# Patient Record
Sex: FEMALE | Race: BLACK OR AFRICAN AMERICAN | Employment: OTHER | URBAN - METROPOLITAN AREA
[De-identification: names, ages, dates, MRNs, and addresses within clinical notes are randomized per-mention and may not be internally consistent; named-entity substitution may affect disease eponyms.]

---

## 2017-10-09 ENCOUNTER — APPOINTMENT (OUTPATIENT)
Dept: GENERAL RADIOLOGY | Age: 77
End: 2017-10-09
Attending: EMERGENCY MEDICINE
Payer: MEDICARE

## 2017-10-09 ENCOUNTER — HOSPITAL ENCOUNTER (EMERGENCY)
Age: 77
Discharge: HOME | End: 2017-10-09
Attending: EMERGENCY MEDICINE | Admitting: EMERGENCY MEDICINE
Payer: MEDICARE

## 2017-10-09 VITALS
OXYGEN SATURATION: 97 % | HEART RATE: 83 BPM | DIASTOLIC BLOOD PRESSURE: 69 MMHG | SYSTOLIC BLOOD PRESSURE: 120 MMHG | RESPIRATION RATE: 19 BRPM | TEMPERATURE: 98.5 F

## 2017-10-09 DIAGNOSIS — R53.1 WEAKNESS: Primary | ICD-10-CM

## 2017-10-09 LAB
ANION GAP SERPL CALC-SCNC: 9 MMOL/L (ref 3–18)
APPEARANCE UR: ABNORMAL
BACTERIA URNS QL MICRO: ABNORMAL /HPF
BASOPHILS # BLD: 0 K/UL (ref 0–0.06)
BASOPHILS NFR BLD: 0 % (ref 0–2)
BILIRUB UR QL: NEGATIVE
BUN SERPL-MCNC: 15 MG/DL (ref 7–18)
BUN/CREAT SERPL: 22 (ref 12–20)
CALCIUM SERPL-MCNC: 9.7 MG/DL (ref 8.5–10.1)
CHLORIDE SERPL-SCNC: 99 MMOL/L (ref 100–108)
CO2 SERPL-SCNC: 32 MMOL/L (ref 21–32)
COLOR UR: ABNORMAL
CREAT SERPL-MCNC: 0.67 MG/DL (ref 0.6–1.3)
DIFFERENTIAL METHOD BLD: ABNORMAL
EOSINOPHIL # BLD: 0.1 K/UL (ref 0–0.4)
EOSINOPHIL NFR BLD: 3 % (ref 0–5)
EPITH CASTS URNS QL MICRO: ABNORMAL /LPF (ref 0–5)
ERYTHROCYTE [DISTWIDTH] IN BLOOD BY AUTOMATED COUNT: 13.8 % (ref 11.6–14.5)
GLUCOSE SERPL-MCNC: 84 MG/DL (ref 74–99)
GLUCOSE UR STRIP.AUTO-MCNC: NEGATIVE MG/DL
HCT VFR BLD AUTO: 36.7 % (ref 35–45)
HGB BLD-MCNC: 11.7 G/DL (ref 12–16)
HGB UR QL STRIP: NEGATIVE
KETONES UR QL STRIP.AUTO: NEGATIVE MG/DL
LEUKOCYTE ESTERASE UR QL STRIP.AUTO: ABNORMAL
LYMPHOCYTES # BLD: 1 K/UL (ref 0.9–3.6)
LYMPHOCYTES NFR BLD: 25 % (ref 21–52)
MCH RBC QN AUTO: 30.6 PG (ref 24–34)
MCHC RBC AUTO-ENTMCNC: 31.9 G/DL (ref 31–37)
MCV RBC AUTO: 96.1 FL (ref 74–97)
MONOCYTES # BLD: 0.2 K/UL (ref 0.05–1.2)
MONOCYTES NFR BLD: 6 % (ref 3–10)
NEUTS SEG # BLD: 2.7 K/UL (ref 1.8–8)
NEUTS SEG NFR BLD: 66 % (ref 40–73)
NITRITE UR QL STRIP.AUTO: NEGATIVE
PH UR STRIP: 6 [PH] (ref 5–8)
PLATELET # BLD AUTO: 188 K/UL (ref 135–420)
PMV BLD AUTO: 10.5 FL (ref 9.2–11.8)
POTASSIUM SERPL-SCNC: 3.6 MMOL/L (ref 3.5–5.5)
PROT UR STRIP-MCNC: ABNORMAL MG/DL
RBC # BLD AUTO: 3.82 M/UL (ref 4.2–5.3)
RBC #/AREA URNS HPF: 0 /HPF (ref 0–5)
SODIUM SERPL-SCNC: 140 MMOL/L (ref 136–145)
SP GR UR REFRACTOMETRY: 1.02 (ref 1–1.03)
TROPONIN I SERPL-MCNC: <0.02 NG/ML (ref 0–0.04)
TSH SERPL DL<=0.05 MIU/L-ACNC: 2.04 UIU/ML (ref 0.36–3.74)
UROBILINOGEN UR QL STRIP.AUTO: 1 EU/DL (ref 0.2–1)
WBC # BLD AUTO: 4 K/UL (ref 4.6–13.2)
WBC URNS QL MICRO: ABNORMAL /HPF (ref 0–4)

## 2017-10-09 PROCEDURE — 71010 XR CHEST PORT: CPT

## 2017-10-09 PROCEDURE — 84484 ASSAY OF TROPONIN QUANT: CPT | Performed by: EMERGENCY MEDICINE

## 2017-10-09 PROCEDURE — 81001 URINALYSIS AUTO W/SCOPE: CPT | Performed by: EMERGENCY MEDICINE

## 2017-10-09 PROCEDURE — 93005 ELECTROCARDIOGRAM TRACING: CPT

## 2017-10-09 PROCEDURE — 80048 BASIC METABOLIC PNL TOTAL CA: CPT | Performed by: EMERGENCY MEDICINE

## 2017-10-09 PROCEDURE — 85025 COMPLETE CBC W/AUTO DIFF WBC: CPT | Performed by: EMERGENCY MEDICINE

## 2017-10-09 PROCEDURE — 84443 ASSAY THYROID STIM HORMONE: CPT | Performed by: EMERGENCY MEDICINE

## 2017-10-09 PROCEDURE — 99285 EMERGENCY DEPT VISIT HI MDM: CPT

## 2017-10-09 NOTE — ED TRIAGE NOTES
Per EMS-Patient with generalized weakness. Patient's neighbor states she came over to check on patient and found her in the home with the heat turned all the way up with the patient complaining of weakness all over. Patient has alzheimer's.

## 2017-10-09 NOTE — DISCHARGE INSTRUCTIONS
Weakness: Care Instructions  Your Care Instructions  Weakness is a lack of physical or muscle strength. You may feel that you need to make extra effort to move your arms, legs, or other muscles. Generalized weakness means that you feel weak in most areas of your body. Another type of weakness may affect just one muscle or group of muscles. You may feel weak and tired after you have done too much activity, such as taking an extra-long hike. This is not a serious problem. It often goes away on its own. Feeling weak can also be caused by medical conditions like thyroid problems, depression, or a virus. Sometimes the cause can be serious. Your doctor may want to do more tests to try to find the cause of the weakness. The doctor has checked you carefully, but problems can develop later. If you notice any problems or new symptoms, get medical treatment right away. Follow-up care is a key part of your treatment and safety. Be sure to make and go to all appointments, and call your doctor if you are having problems. It's also a good idea to know your test results and keep a list of the medicines you take. How can you care for yourself at home? · Rest when you feel tired. · Be safe with medicines. If your doctor prescribed medicine, take it exactly as prescribed. Call your doctor if you think you are having a problem with your medicine. You will get more details on the specific medicines your doctor prescribes. · Do not skip meals. Eating a balanced diet may increase your energy level. · Get some physical activity every day, but do not get too tired. When should you call for help? Call your doctor now or seek immediate medical care if:  · You have new or worse weakness. · You are dizzy or lightheaded, or you feel like you may faint. Watch closely for changes in your health, and be sure to contact your doctor if:  · You do not get better as expected. Where can you learn more?   Go to http://emir.info/. Enter [id number] in the search box to learn more about \"Weakness: Care Instructions. \"  Current as of: March 20, 2017  Content Version: 11.3  © 1856-7762 [employer]. Care instructions adapted under license by [employer] (which disclaims liability or warranty for this information). If you have questions about a medical condition or this instruction, always ask your healthcare professional. [address] any warranty or liability for your use of this information.

## 2017-10-09 NOTE — ED NOTES
I have reviewed discharge instructions with the patient. The patient verbalized understanding. Patient taken out by wheelchair and taken home by family.      Patient armband removed and shredded

## 2017-10-09 NOTE — ED PROVIDER NOTES
HPI Comments: 1:21 PM Lexi Retana is a 76 y.o. female w/ PMHx of HTN and sarcoidosis who presents to the ED c/o  generalized body weakness and fatigue. Pt states she lives at home and she currently denies pain, SOB, CP, and fever. Pt has no other sx or complaints. Past Medical History:   Diagnosis Date    Hypertension     Ill-defined condition     sarcoidosis    Respiratory abnormalities     sarcoidosis       History reviewed. No pertinent surgical history. History reviewed. No pertinent family history. Social History     Social History    Marital status:      Spouse name: N/A    Number of children: N/A    Years of education: N/A     Occupational History    Not on file. Social History Main Topics    Smoking status: Former Smoker     Packs/day: 1.00     Years: 20.00    Smokeless tobacco: Not on file    Alcohol use No    Drug use: Not on file    Sexual activity: Not on file     Other Topics Concern    Not on file     Social History Narrative         ALLERGIES: Review of patient's allergies indicates no known allergies. Review of Systems   Constitutional: Positive for fatigue. Negative for diaphoresis and fever. HENT: Negative for congestion and sore throat. Eyes: Negative for pain and itching. Respiratory: Negative for cough and shortness of breath. Cardiovascular: Negative for chest pain and palpitations. Gastrointestinal: Negative for abdominal pain and diarrhea. Endocrine: Negative for polydipsia and polyuria. Genitourinary: Negative for dysuria and hematuria. Musculoskeletal: Negative for arthralgias and myalgias. Skin: Negative for rash and wound. Neurological: Positive for weakness. Negative for seizures and syncope. Hematological: Does not bruise/bleed easily. Psychiatric/Behavioral: Negative for agitation and hallucinations.        Vitals:    10/09/17 1300 10/09/17 1330   BP: 129/70 128/71   Pulse:  76   Resp:  18   Temp: 98.5 °F (36.9 °C) SpO2:  94%            Physical Exam   Constitutional: She appears well-developed and well-nourished. HENT:   Head: Normocephalic and atraumatic. Eyes: Conjunctivae are normal. No scleral icterus. Neck: Normal range of motion. Neck supple. No JVD present. Cardiovascular: Normal rate and regular rhythm. Murmur (Fixed split S2 murmur) heard. 4 intact extremity pulses   Pulmonary/Chest: Effort normal and breath sounds normal.   Abdominal: Soft. She exhibits no mass. There is no tenderness. Musculoskeletal: Normal range of motion. Lymphadenopathy:     She has no cervical adenopathy. Neurological: She is alert. Grossly intact     Skin: Skin is warm and dry. Nursing note and vitals reviewed.        MDM  Number of Diagnoses or Management Options  Diagnosis management comments: Differential diagnosis: dehydration, pneumonia, UTI, hyponatremia     ED Course       Procedures  Vitals:  Patient Vitals for the past 12 hrs:   Temp Pulse Resp BP SpO2   10/09/17 1330 - 76 18 128/71 94 %   10/09/17 1300 98.5 °F (36.9 °C) - - 129/70 -       Medications Ordered:  Medications - No data to display    Lab Findings:  Recent Results (from the past 12 hour(s))   EKG, 12 LEAD, INITIAL    Collection Time: 10/09/17 12:56 PM   Result Value Ref Range    Ventricular Rate 84 BPM    Atrial Rate 84 BPM    P-R Interval 134 ms    QRS Duration 70 ms    Q-T Interval 356 ms    QTC Calculation (Bezet) 420 ms    Calculated P Axis 58 degrees    Calculated R Axis 28 degrees    Calculated T Axis 52 degrees    Diagnosis       Normal sinus rhythm  Normal ECG  When compared with ECG of 13-JAN-2016 17:07,  No significant change was found     CBC WITH AUTOMATED DIFF    Collection Time: 10/09/17  1:08 PM   Result Value Ref Range    WBC 4.0 (L) 4.6 - 13.2 K/uL    RBC 3.82 (L) 4.20 - 5.30 M/uL    HGB 11.7 (L) 12.0 - 16.0 g/dL    HCT 36.7 35.0 - 45.0 %    MCV 96.1 74.0 - 97.0 FL    MCH 30.6 24.0 - 34.0 PG    MCHC 31.9 31.0 - 37.0 g/dL    RDW 13.8 11.6 - 14.5 %    PLATELET 246 358 - 570 K/uL    MPV 10.5 9.2 - 11.8 FL    NEUTROPHILS 66 40 - 73 %    LYMPHOCYTES 25 21 - 52 %    MONOCYTES 6 3 - 10 %    EOSINOPHILS 3 0 - 5 %    BASOPHILS 0 0 - 2 %    ABS. NEUTROPHILS 2.7 1.8 - 8.0 K/UL    ABS. LYMPHOCYTES 1.0 0.9 - 3.6 K/UL    ABS. MONOCYTES 0.2 0.05 - 1.2 K/UL    ABS. EOSINOPHILS 0.1 0.0 - 0.4 K/UL    ABS. BASOPHILS 0.0 0.0 - 0.06 K/UL    DF AUTOMATED     METABOLIC PANEL, BASIC    Collection Time: 10/09/17  1:08 PM   Result Value Ref Range    Sodium 140 136 - 145 mmol/L    Potassium 3.6 3.5 - 5.5 mmol/L    Chloride 99 (L) 100 - 108 mmol/L    CO2 32 21 - 32 mmol/L    Anion gap 9 3.0 - 18 mmol/L    Glucose 84 74 - 99 mg/dL    BUN 15 7.0 - 18 MG/DL    Creatinine 0.67 0.6 - 1.3 MG/DL    BUN/Creatinine ratio 22 (H) 12 - 20      GFR est AA >60 >60 ml/min/1.73m2    GFR est non-AA >60 >60 ml/min/1.73m2    Calcium 9.7 8.5 - 10.1 MG/DL   TROPONIN I    Collection Time: 10/09/17  1:08 PM   Result Value Ref Range    Troponin-I, Qt. <0.02 0.0 - 0.045 NG/ML   TSH 3RD GENERATION    Collection Time: 10/09/17  1:08 PM   Result Value Ref Range    TSH 2.04 0.36 - 3.74 uIU/mL       EKG Interpretation by ED physician:  1256 sinus rhythm @ 84 no acute process    X-ray, CT or radiology findings or impressions:  XR CHEST PORT   Final Result   Impression:  Chronic interstitial changes with upper lobe predominant interstitial fibrosis  with scarring and bronchiectasis and perihilar distortion/prominence in the  setting of pulmonary sarcoid. Overall, little change since the prior study. No  evidence of acute cardiopulmonary process.                Progress notes, consult notes, or additional procedure notes:  3:33 PM no inciting cause of weakness found. Will ambulate the patient then likely discharge. Vitals stable, pt without complaint, discussed results. Diagnosis:   1.  Weakness        Disposition: home    Follow-up Information     Follow up With Details Comments Contact Info Rosa Masterson MD   Patient can only remember the practice name and not the physician      MD [address]   [address] [facility]   [facility]  [phone]             Patient's Medications   Start Taking    No medications on file   Continue Taking    AMLODIPINE (NORVASC) 5 MG TABLET    Take 5 mg by mouth two (2) times a day. FERROUS SULFATE 325 MG (65 MG IRON) TABLET    Take 325 mg by mouth three (3) times daily (with meals). PREDNISONE (DELTASONE) 10 MG TABLET    Take 6 tablets day 1, Take 5 tablets day 2  Take 4 tablets day 3, Take 3 tablets day 4  Take 2 tablets day 5, Take 1 tablet day 6  Then stop. SENNOSIDES (SENNA) 8.6 MG CAP    Take 8.6 mg by mouth nightly. These Medications have changed    No medications on file   Stop Taking    No medications on file       [facility] acting as a scribe for and in the presence of Melissa Wilson MD      October 09, 2017 at [facility] AT [facility]       Provider Attestation:      I personally performed the services described in the documentation, reviewed the documentation, as recorded by the scribe in my presence, and it accurately and completely records my words and actions.  October 09, 2017 at 1:21 PM - Melissa Wilson MD

## 2017-10-10 LAB
ATRIAL RATE: 84 BPM
CALCULATED P AXIS, ECG09: 58 DEGREES
CALCULATED R AXIS, ECG10: 28 DEGREES
CALCULATED T AXIS, ECG11: 52 DEGREES
DIAGNOSIS, 93000: NORMAL
P-R INTERVAL, ECG05: 134 MS
Q-T INTERVAL, ECG07: 356 MS
QRS DURATION, ECG06: 70 MS
QTC CALCULATION (BEZET), ECG08: 420 MS
VENTRICULAR RATE, ECG03: 84 BPM

## 2018-06-04 ENCOUNTER — APPOINTMENT (OUTPATIENT)
Dept: GENERAL RADIOLOGY | Age: 78
End: 2018-06-04
Attending: EMERGENCY MEDICINE
Payer: MEDICARE

## 2018-06-04 ENCOUNTER — HOSPITAL ENCOUNTER (EMERGENCY)
Age: 78
Discharge: HOME | End: 2018-06-05
Attending: EMERGENCY MEDICINE
Payer: MEDICARE

## 2018-06-04 DIAGNOSIS — R53.1 GENERALIZED WEAKNESS: Primary | ICD-10-CM

## 2018-06-04 LAB
ANION GAP SERPL CALC-SCNC: 6 MMOL/L (ref 3–18)
APPEARANCE UR: CLEAR
APTT PPP: 25.5 SEC (ref 23–36.4)
BACTERIA URNS QL MICRO: ABNORMAL /HPF
BASOPHILS # BLD: 0 K/UL (ref 0–0.06)
BASOPHILS NFR BLD: 0 % (ref 0–2)
BILIRUB UR QL: NEGATIVE
BNP SERPL-MCNC: 188 PG/ML (ref 0–1800)
BUN SERPL-MCNC: 30 MG/DL (ref 7–18)
BUN/CREAT SERPL: 39 (ref 12–20)
CALCIUM SERPL-MCNC: 9.3 MG/DL (ref 8.5–10.1)
CHLORIDE SERPL-SCNC: 104 MMOL/L (ref 100–108)
CO2 SERPL-SCNC: 31 MMOL/L (ref 21–32)
COLOR UR: ABNORMAL
CREAT SERPL-MCNC: 0.76 MG/DL (ref 0.6–1.3)
DIFFERENTIAL METHOD BLD: ABNORMAL
EOSINOPHIL # BLD: 0.1 K/UL (ref 0–0.4)
EOSINOPHIL NFR BLD: 3 % (ref 0–5)
EPITH CASTS URNS QL MICRO: ABNORMAL /LPF (ref 0–5)
ERYTHROCYTE [DISTWIDTH] IN BLOOD BY AUTOMATED COUNT: 12.7 % (ref 11.6–14.5)
GLUCOSE SERPL-MCNC: 108 MG/DL (ref 74–99)
GLUCOSE UR STRIP.AUTO-MCNC: NEGATIVE MG/DL
HCT VFR BLD AUTO: 44.8 % (ref 35–45)
HGB BLD-MCNC: 14.8 G/DL (ref 12–16)
HGB UR QL STRIP: NEGATIVE
HYALINE CASTS URNS QL MICRO: ABNORMAL /LPF (ref 0–2)
INR PPP: 1.1 (ref 0.8–1.2)
KETONES UR QL STRIP.AUTO: 15 MG/DL
LEUKOCYTE ESTERASE UR QL STRIP.AUTO: ABNORMAL
LYMPHOCYTES # BLD: 1.1 K/UL (ref 0.9–3.6)
LYMPHOCYTES NFR BLD: 22 % (ref 21–52)
MCH RBC QN AUTO: 32.5 PG (ref 24–34)
MCHC RBC AUTO-ENTMCNC: 33 G/DL (ref 31–37)
MCV RBC AUTO: 98.2 FL (ref 74–97)
MONOCYTES # BLD: 0.4 K/UL (ref 0.05–1.2)
MONOCYTES NFR BLD: 9 % (ref 3–10)
MUCOUS THREADS URNS QL MICRO: ABNORMAL /LPF
NEUTS SEG # BLD: 3.3 K/UL (ref 1.8–8)
NEUTS SEG NFR BLD: 66 % (ref 40–73)
NITRITE UR QL STRIP.AUTO: NEGATIVE
PH UR STRIP: 6 [PH] (ref 5–8)
PLATELET # BLD AUTO: 92 K/UL (ref 135–420)
PMV BLD AUTO: 12.1 FL (ref 9.2–11.8)
POTASSIUM SERPL-SCNC: 3.8 MMOL/L (ref 3.5–5.5)
PROT UR STRIP-MCNC: 100 MG/DL
PROTHROMBIN TIME: 13.6 SEC (ref 11.5–15.2)
RBC # BLD AUTO: 4.56 M/UL (ref 4.2–5.3)
RBC #/AREA URNS HPF: ABNORMAL /HPF (ref 0–5)
SODIUM SERPL-SCNC: 141 MMOL/L (ref 136–145)
SP GR UR REFRACTOMETRY: 1.02 (ref 1–1.03)
TROPONIN I SERPL-MCNC: <0.02 NG/ML (ref 0–0.04)
UROBILINOGEN UR QL STRIP.AUTO: 1 EU/DL (ref 0.2–1)
WBC # BLD AUTO: 5 K/UL (ref 4.6–13.2)
WBC URNS QL MICRO: ABNORMAL /HPF (ref 0–4)

## 2018-06-04 PROCEDURE — 80048 BASIC METABOLIC PNL TOTAL CA: CPT | Performed by: EMERGENCY MEDICINE

## 2018-06-04 PROCEDURE — 93005 ELECTROCARDIOGRAM TRACING: CPT

## 2018-06-04 PROCEDURE — 81001 URINALYSIS AUTO W/SCOPE: CPT | Performed by: EMERGENCY MEDICINE

## 2018-06-04 PROCEDURE — 96374 THER/PROPH/DIAG INJ IV PUSH: CPT

## 2018-06-04 PROCEDURE — 83880 ASSAY OF NATRIURETIC PEPTIDE: CPT | Performed by: EMERGENCY MEDICINE

## 2018-06-04 PROCEDURE — 74011250637 HC RX REV CODE- 250/637: Performed by: EMERGENCY MEDICINE

## 2018-06-04 PROCEDURE — 85025 COMPLETE CBC W/AUTO DIFF WBC: CPT | Performed by: EMERGENCY MEDICINE

## 2018-06-04 PROCEDURE — 99285 EMERGENCY DEPT VISIT HI MDM: CPT

## 2018-06-04 PROCEDURE — 77030005563 HC CATH URETH INT MMGH -A

## 2018-06-04 PROCEDURE — 74011250636 HC RX REV CODE- 250/636: Performed by: EMERGENCY MEDICINE

## 2018-06-04 PROCEDURE — 71045 X-RAY EXAM CHEST 1 VIEW: CPT

## 2018-06-04 PROCEDURE — 85730 THROMBOPLASTIN TIME PARTIAL: CPT | Performed by: EMERGENCY MEDICINE

## 2018-06-04 PROCEDURE — 51701 INSERT BLADDER CATHETER: CPT

## 2018-06-04 PROCEDURE — 85610 PROTHROMBIN TIME: CPT | Performed by: EMERGENCY MEDICINE

## 2018-06-04 PROCEDURE — 84484 ASSAY OF TROPONIN QUANT: CPT | Performed by: EMERGENCY MEDICINE

## 2018-06-04 RX ORDER — ONDANSETRON 2 MG/ML
4 INJECTION INTRAMUSCULAR; INTRAVENOUS
Status: COMPLETED | OUTPATIENT
Start: 2018-06-04 | End: 2018-06-04

## 2018-06-04 RX ORDER — GUAIFENESIN 100 MG/5ML
324 LIQUID (ML) ORAL
Status: DISCONTINUED | OUTPATIENT
Start: 2018-06-04 | End: 2018-06-04 | Stop reason: DRUGHIGH

## 2018-06-04 RX ORDER — GUAIFENESIN 100 MG/5ML
162 LIQUID (ML) ORAL
Status: COMPLETED | OUTPATIENT
Start: 2018-06-04 | End: 2018-06-04

## 2018-06-04 RX ADMIN — ASPIRIN 81 MG 162 MG: 81 TABLET ORAL at 17:51

## 2018-06-04 RX ADMIN — ONDANSETRON 4 MG: 2 INJECTION INTRAMUSCULAR; INTRAVENOUS at 18:12

## 2018-06-04 NOTE — ED TRIAGE NOTES
Pt's cousin, Ervin Garza ([phone]) arrives: Jacey Viond reports he called EMS R/T weakness/unable to walk and decrease appetite; pt has Hx of Alzheimer disease; pt has no c/o; pt lives at [facility].

## 2018-06-04 NOTE — ED PROVIDER NOTES
EMERGENCY DEPARTMENT HISTORY AND PHYSICAL EXAM    5:40 PM      Date: 6/4/2018  Patient Name: Zain Potts    History of Presenting Illness     Chief Complaint   Patient presents with    Fatigue         History Provided By: Patient and patient's cousin    Chief Complaint: Weakness  Duration:  Yesterday  Timing:  Worsening  Location: Generalized   Quality: N/A  Severity: N/A  Modifying Factors: N/A  Associated Symptoms: Light-headedness, fatigue, and loss of appetite. Denies headache, back pain, and dysuria      Additional History (Context): Zain Potts is a 76 y.o. female with a history of alzheimer's who presents with worsening generalized weakness that began yesterday. The patient has associated symptoms of light-headedness, fatigue, and loss of appetite. The patient denies headache, back pain, and dysuria. The patient's cousin was concerned as he tried calling her today, but she was so tried she could barely speak. The patient's cousin states she has difficulty ambulating. PCP: Rosa Masterson MD    Current Outpatient Prescriptions   Medication Sig Dispense Refill    predniSONE (DELTASONE) 10 mg tablet Take 6 tablets day 1, Take 5 tablets day 2  Take 4 tablets day 3, Take 3 tablets day 4  Take 2 tablets day 5, Take 1 tablet day 6  Then stop. 21 Tab 0    amLODIPine (NORVASC) 5 mg tablet Take 5 mg by mouth two (2) times a day.  ferrous sulfate 325 mg (65 mg iron) tablet Take 325 mg by mouth three (3) times daily (with meals).  sennosides (SENNA) 8.6 mg cap Take 8.6 mg by mouth nightly. Past History     Past Medical History:  Past Medical History:   Diagnosis Date    Alzheimer disease     Hypertension     Ill-defined condition     sarcoidosis    Respiratory abnormalities     sarcoidosis       Past Surgical History:  History reviewed. No pertinent surgical history. Family History:  History reviewed. No pertinent family history.     Social History:  Social History   Substance Use Topics    Smoking status: Former Smoker     Packs/day: 1.00     Years: 20.00    Smokeless tobacco: Former User    Alcohol use No       Allergies:  No Known Allergies      Review of Systems       Review of Systems   Constitutional: Positive for appetite change and fatigue. Negative for chills and fever. HENT: Negative for ear pain and sore throat. Eyes: Negative for pain and visual disturbance. Respiratory: Negative for cough and shortness of breath. Cardiovascular: Negative for chest pain and palpitations. Gastrointestinal: Negative for abdominal pain, diarrhea, nausea and vomiting. Genitourinary: Negative for dysuria and flank pain. Musculoskeletal: Negative for back pain and neck pain. Neurological: Positive for dizziness and light-headedness. Negative for syncope and headaches. Psychiatric/Behavioral: Negative for agitation. The patient is not nervous/anxious. All other systems reviewed and are negative. Physical Exam     Visit Vitals    /79    Pulse 70    Temp 97.7 °F (36.5 °C)    Resp 20    SpO2 97%         Physical Exam   Constitutional: She appears cachectic. Frail appearing. HENT:   Head: Normocephalic and atraumatic. Mouth/Throat: Oropharynx is clear and moist. Mucous membranes are dry. Eyes: Pupils are equal, round, and reactive to light. No scleral icterus. Neck: Neck supple. No tracheal deviation present. Cardiovascular: Normal rate. No murmur heard. Holosystolic murmer of chest border split S2.   Pulmonary/Chest: Effort normal. No respiratory distress. Slightly decreased left sided breathe sounds with faint expiratory wheeze. Abdominal: Soft. There is no tenderness. Musculoskeletal: Normal range of motion. She exhibits no edema or deformity. Neurological: She is alert. No gross neuro deficit. Oriented to self. Seems to answer questions appropriately. Skin: Skin is warm and dry. No rash noted. She is not diaphoretic.    Psychiatric: Pleasantly demented. Nursing note and vitals reviewed. Diagnostic Study Results     Labs -  Labs Reviewed   CBC WITH AUTOMATED DIFF - Abnormal; Notable for the following:        Result Value    MCV 98.2 (*)     PLATELET 92 (*)     MPV 12.1 (*)     All other components within normal limits   URINALYSIS W/ RFLX MICROSCOPIC - Abnormal; Notable for the following:     Protein 100 (*)     Ketone 15 (*)     Leukocyte Esterase TRACE (*)     All other components within normal limits   URINE MICROSCOPIC ONLY - Abnormal; Notable for the following:     Bacteria 1+ (*)     Mucus 1+ (*)     All other components within normal limits   METABOLIC PANEL, BASIC   NT-PRO BNP   PROTHROMBIN TIME + INR   PTT   TROPONIN I       Radiologic Studies -   XR CHEST PORT    (Results Pending)         Medical Decision Making   I am the first provider for this patient. I reviewed the vital signs, available nursing notes, past medical history, past surgical history, family history and social history. Vital Signs-Reviewed the patient's vital signs. Pulse Oximetry Analysis -  94% on room air (Interpretation)    Cardiac Monitor:  Rate: 95  Rhythm:  Normal Sinus Rhythm    EKG: Interpreted by the EP. Time 17:06  Sinus tachy rate 101. Nl axis, Intervals wnl. Nonspecific ST-T wave abnormality. Maybe worsened by artifact    Repeat ECG:  Interpreted by EP. Time: 19:09  NSR. Rate 72. Nl axis, Intervals wnl, no acute st elevations or depressions noted. Records Reviewed: Nursing Notes and Old Medical Records (Time of Review: 5:40 PM)    ED Course: Progress Notes, Reevaluation, and Consults:  ED Course   Comment By Time   77F with hx progressive dementia and forgets to eat. Ketan Abel) is a nurse up in [place] and trying to find long-term placement. PCP office Dr. Sergio Brown is calling to prompt pt to take her pills, wear her oxygen, and eat. Pt on waiting for multiple nursing homes. PCP will place APS report.  PCP states daughter states she will take pt with her if she is discharged today. Hx of hypoxia on room air, has home oxygen. Work-up already preformed for occult malignancy per PCP. Mahin Souza coming from [facility] [place] tomorrow and will plan to take patient home with her if discharged tonight. Pt ambulates with cane at home. Dr. Van Rubinstein [phone]. Donnell Klinealta, DO 06/04 1914       Provider Notes (Medical Decision Making): Vitals WNLs. DDX: dementia malnutrition, dehydration, electrolyte abnormality, failure to thrive    76 y.o. female with noted past medical history who presented with generalized weakness with history of progressively worsening alzheimer's. patient had no complaints. See documentation above for extensive conversation that was had with her PCP. APS has been notified by PCP. The differential above was considered. The patient was given:  Medications   ondansetron [facility]) injection 4 mg (4 mg IntraVENous Given 6/4/18 1812)   aspirin chewable tablet 162 mg (162 mg Oral Given 6/4/18 1751)     Diagnostics notable for mild thrombocytopenia. No convincing evidence for UTI. Discussed with pt's cousin who did not feel comfortable taking her home to wait for her niece to pick her up to take her to [place] that would observe her in the Emergency Department overnight with plan to discharge in the morning into the care of her niece. Diagnosis     Clinical Impression:   1. Generalized weakness        Disposition: Pending    Follow-up Information     None           Patient's Medications   Start Taking    No medications on file   Continue Taking    AMLODIPINE (NORVASC) 5 MG TABLET    Take 5 mg by mouth two (2) times a day. FERROUS SULFATE 325 MG (65 MG IRON) TABLET    Take 325 mg by mouth three (3) times daily (with meals). PREDNISONE (DELTASONE) 10 MG TABLET    Take 6 tablets day 1, Take 5 tablets day 2  Take 4 tablets day 3, Take 3 tablets day 4  Take 2 tablets day 5, Take 1 tablet day 6  Then stop. SENNOSIDES (SENNA) 8.6 MG CAP    Take 8.6 mg by mouth nightly. These Medications have changed    No medications on file   Stop Taking    No medications on file     _______________________________    Amievelyn dAame acting as a scribe for and in the presence of Anastasia Blount, DO      June 04, 2018 at 7:49 PM       Provider Attestation:      I personally performed the services described in the documentation, reviewed the documentation, as recorded by the scribe in my presence, and it accurately and completely records my words and actions.  June 04, 2018 at 7:49 PM - Anastasia Blount, DO

## 2018-06-05 VITALS
HEART RATE: 117 BPM | OXYGEN SATURATION: 97 % | RESPIRATION RATE: 16 BRPM | DIASTOLIC BLOOD PRESSURE: 72 MMHG | SYSTOLIC BLOOD PRESSURE: 113 MMHG | TEMPERATURE: 97.7 F

## 2018-06-05 LAB
ATRIAL RATE: 101 BPM
ATRIAL RATE: 72 BPM
CALCULATED P AXIS, ECG09: 41 DEGREES
CALCULATED P AXIS, ECG09: 58 DEGREES
CALCULATED R AXIS, ECG10: 23 DEGREES
CALCULATED R AXIS, ECG10: 31 DEGREES
CALCULATED T AXIS, ECG11: 44 DEGREES
CALCULATED T AXIS, ECG11: 54 DEGREES
DIAGNOSIS, 93000: NORMAL
DIAGNOSIS, 93000: NORMAL
P-R INTERVAL, ECG05: 138 MS
P-R INTERVAL, ECG05: 146 MS
Q-T INTERVAL, ECG07: 370 MS
Q-T INTERVAL, ECG07: 426 MS
QRS DURATION, ECG06: 78 MS
QRS DURATION, ECG06: 78 MS
QTC CALCULATION (BEZET), ECG08: 466 MS
QTC CALCULATION (BEZET), ECG08: 479 MS
VENTRICULAR RATE, ECG03: 101 BPM
VENTRICULAR RATE, ECG03: 72 BPM

## 2018-06-05 NOTE — PROGRESS NOTES
Spoke with Karissa Corrales, niece [phone], and states that she has appointments today and will be able to  pt around 1800 today. ED MD and charge nurse made aware.

## 2018-06-05 NOTE — PROGRESS NOTES
Chart reviewed. Met with pt at bedside. Pt states that her niece, Dirk Valdivia, will come pick her up. Pt unable to tell niece's phone #. She gave permission for me to call her cousin, Miriam Keyes, to see if he knows #. Placed a call and left a message to return call.

## 2018-06-05 NOTE — ED NOTES
Received patient report from Claudia Simental, Rn. Assuming care of patient at this time. Son refused to take patient home so patient being held in ER until 0700 am when niece form [facility] comes to be her caretaker and take her home. [place] to pts house at bedside.  Patient resting in NAD

## 2018-06-05 NOTE — ED NOTES
Vitals:  Patient Vitals for the past 12 hrs:   Temp Pulse Resp BP SpO2   06/05/18 0000 - 86 21 115/71 91 %   06/04/18 2230 - 81 18 102/71 94 %   06/04/18 2215 - 83 18 116/79 92 %   06/04/18 2200 - 80 19 121/74 96 %   06/04/18 2145 - 76 18 112/66 97 %   06/04/18 2130 - 83 20 117/79 95 %   06/04/18 2115 - 74 18 121/79 96 %   06/04/18 2100 - 73 18 138/81 98 %   06/04/18 2045 - 77 17 133/88 97 %   06/04/18 2030 - 70 17 133/78 98 %   06/04/18 1915 - 70 20 118/79 97 %   06/04/18 1900 - 69 17 139/79 98 %   06/04/18 1845 - 68 17 (!) 136/120 94 %   06/04/18 1830 - 73 17 142/78 96 %   06/04/18 1815 - 81 15 (!) 147/98 94 %   06/04/18 1800 - 76 20 133/80 97 %   06/04/18 1745 - 85 16 108/76 96 %   06/04/18 1730 - 76 19 124/79 96 %   06/04/18 1715 - 79 20 125/80 95 %   06/04/18 1700 97.7 °F (36.5 °C) 90 21 107/73 95 %   06/04/18 1656 - 95 20 108/72 94 %         Medications ordered:   Medications   ondansetron (ZOFRAN) injection 4 mg (4 mg IntraVENous Given 6/4/18 1812)   aspirin chewable tablet 162 mg (162 mg Oral Given 6/4/18 1751)         Lab findings:  Recent Results (from the past 12 hour(s))   EKG, 12 LEAD, INITIAL    Collection Time: 06/04/18  5:06 PM   Result Value Ref Range    Ventricular Rate 101 BPM    Atrial Rate 101 BPM    P-R Interval 146 ms    QRS Duration 78 ms    Q-T Interval 370 ms    QTC Calculation (Bezet) 479 ms    Calculated P Axis 41 degrees    Calculated R Axis 23 degrees    Calculated T Axis 44 degrees    Diagnosis       Sinus tachycardia  Possible Left atrial enlargement  ST elevation, consider early repolarization, pericarditis, or injury  Nonspecific ST and T wave abnormality  Abnormal ECG  When compared with ECG of 09-OCT-2017 12:56,  Non-specific change in ST segment in Inferior leads  ST elevation now present in Lateral leads  QT has lengthened     CBC WITH AUTOMATED DIFF    Collection Time: 06/04/18  6:12 PM   Result Value Ref Range    WBC 5.0 4.6 - 13.2 K/uL    RBC 4.56 4.20 - 5.30 M/uL    HGB 14.8 12.0 - 16.0 g/dL    HCT 44.8 35.0 - 45.0 %    MCV 98.2 (H) 74.0 - 97.0 FL    MCH 32.5 24.0 - 34.0 PG    MCHC 33.0 31.0 - 37.0 g/dL    RDW 12.7 11.6 - 14.5 %    PLATELET 92 (L) 881 - 420 K/uL    MPV 12.1 (H) 9.2 - 11.8 FL    NEUTROPHILS 66 40 - 73 %    LYMPHOCYTES 22 21 - 52 %    MONOCYTES 9 3 - 10 %    EOSINOPHILS 3 0 - 5 %    BASOPHILS 0 0 - 2 %    ABS. NEUTROPHILS 3.3 1.8 - 8.0 K/UL    ABS. LYMPHOCYTES 1.1 0.9 - 3.6 K/UL    ABS. MONOCYTES 0.4 0.05 - 1.2 K/UL    ABS. EOSINOPHILS 0.1 0.0 - 0.4 K/UL    ABS.  BASOPHILS 0.0 0.0 - 0.06 K/UL    DF AUTOMATED     METABOLIC PANEL, BASIC    Collection Time: 06/04/18  6:12 PM   Result Value Ref Range    Sodium 141 136 - 145 mmol/L    Potassium 3.8 3.5 - 5.5 mmol/L    Chloride 104 100 - 108 mmol/L    CO2 31 21 - 32 mmol/L    Anion gap 6 3.0 - 18 mmol/L    Glucose 108 (H) 74 - 99 mg/dL    BUN 30 (H) 7.0 - 18 MG/DL    Creatinine 0.76 0.6 - 1.3 MG/DL    BUN/Creatinine ratio 39 (H) 12 - 20      GFR est AA >60 >60 ml/min/1.73m2    GFR est non-AA >60 >60 ml/min/1.73m2    Calcium 9.3 8.5 - 10.1 MG/DL   NT-PRO BNP    Collection Time: 06/04/18  6:12 PM   Result Value Ref Range    NT pro- 0 - 1800 PG/ML   PROTHROMBIN TIME + INR    Collection Time: 06/04/18  6:12 PM   Result Value Ref Range    Prothrombin time 13.6 11.5 - 15.2 sec    INR 1.1 0.8 - 1.2     PTT    Collection Time: 06/04/18  6:12 PM   Result Value Ref Range    aPTT 25.5 23.0 - 36.4 SEC   TROPONIN I    Collection Time: 06/04/18  6:12 PM   Result Value Ref Range    Troponin-I, Qt. <0.02 0.0 - 0.045 NG/ML   URINALYSIS W/ RFLX MICROSCOPIC    Collection Time: 06/04/18  6:30 PM   Result Value Ref Range    Color DARK YELLOW      Appearance CLEAR      Specific gravity 1.025 1.005 - 1.030      pH (UA) 6.0 5.0 - 8.0      Protein 100 (A) NEG mg/dL    Glucose NEGATIVE  NEG mg/dL    Ketone 15 (A) NEG mg/dL    Bilirubin NEGATIVE  NEG      Blood NEGATIVE  NEG      Urobilinogen 1.0 0.2 - 1.0 EU/dL    Nitrites NEGATIVE  NEG Leukocyte Esterase TRACE (A) NEG     URINE MICROSCOPIC ONLY    Collection Time: 06/04/18  6:30 PM   Result Value Ref Range    WBC 0 to 3 0 - 4 /hpf    RBC 0 to 3 0 - 5 /hpf    Epithelial cells 1+ 0 - 5 /lpf    Bacteria 1+ (A) NEG /hpf    Mucus 1+ (A) NEG /lpf    Hyaline cast 0 to 3 0 - 2 /lpf   EKG, 12 LEAD, SUBSEQUENT    Collection Time: 06/04/18  7:09 PM   Result Value Ref Range    Ventricular Rate 72 BPM    Atrial Rate 72 BPM    P-R Interval 138 ms    QRS Duration 78 ms    Q-T Interval 426 ms    QTC Calculation (Bezet) 466 ms    Calculated P Axis 58 degrees    Calculated R Axis 31 degrees    Calculated T Axis 54 degrees    Diagnosis       Normal sinus rhythm  Normal ECG  When compared with ECG of 04-JUN-2018 17:06,  No significant change was found         EKG interpretation by ED Physician:      X-Ray, CT or other radiology findings or impressions:  XR CHEST PORT    (Results Pending)       Progress notes, Consult notes or additional Procedure notes:   T/o from dr Janae Pierce to follow in observation overnight. Pt with worsening dementia, diff taking care of self at home. She has niece coming to pick her up in the morning. D/w dr Agustín Dubose, on call hospitalist who does not feel warranted admission  Pt stable overnight. Plan is for niece to  this morning to take her home with her  Will t/o to dr Joe Landry to ensure pt is picked up    Reevaluation of patient:   stable    Disposition:  Diagnosis:   1. Generalized weakness        Disposition: home    Follow-up Information     None            Patient's Medications   Start Taking    No medications on file   Continue Taking    AMLODIPINE (NORVASC) 5 MG TABLET    Take 5 mg by mouth two (2) times a day. FERROUS SULFATE 325 MG (65 MG IRON) TABLET    Take 325 mg by mouth three (3) times daily (with meals).     PREDNISONE (DELTASONE) 10 MG TABLET    Take 6 tablets day 1, Take 5 tablets day 2  Take 4 tablets day 3, Take 3 tablets day 4  Take 2 tablets day 5, Take 1 tablet day 6  Then stop. SENNOSIDES (SENNA) 8.6 MG CAP    Take 8.6 mg by mouth nightly.    These Medications have changed    No medications on file   Stop Taking    No medications on file

## 2018-06-05 NOTE — ED NOTES
Plan is for niece to assume care of patient @ 0700. Please reiterate discharge instructions to niece.

## 2018-06-06 NOTE — ED NOTES
Assumed care of patient for discharge. I have reviewed discharge instructions with the niece. The niece verbalized understanding. Patient armband removed and shredded. Patient wheeled out to waiting room by ED tech.  Patient in NAD at discharge
